# Patient Record
Sex: FEMALE | Race: WHITE | NOT HISPANIC OR LATINO | URBAN - METROPOLITAN AREA
[De-identification: names, ages, dates, MRNs, and addresses within clinical notes are randomized per-mention and may not be internally consistent; named-entity substitution may affect disease eponyms.]

---

## 2023-11-17 ENCOUNTER — APPOINTMENT (RX ONLY)
Dept: URBAN - METROPOLITAN AREA CLINIC 161 | Facility: CLINIC | Age: 58
Setting detail: DERMATOLOGY
End: 2023-11-17

## 2023-11-17 DIAGNOSIS — Z41.9 ENCOUNTER FOR PROCEDURE FOR PURPOSES OTHER THAN REMEDYING HEALTH STATE, UNSPECIFIED: ICD-10-CM

## 2023-11-17 PROCEDURE — ? DELUXE HYDRAFACIAL

## 2023-11-17 NOTE — PROCEDURE: DELUXE HYDRAFACIAL
Indication: skin texture
Price (Use Numbers Only, No Special Characters Or $): [address]
Location: face
Procedure: Exfoliation
Treatment Number: 1
Solution: Activ-4
Tip: Hydropeel Tip, Blue
Vacuum Pressure Low Setting (Will Not Render If Set To 0): [address]
Vacuum Pressure High Setting (Will Not Render If Set To 0): 0
Number Of Passes: 2
Procedure: Peel
Solution: GlySal 7.5%
Tip: Hydropeel Tip, Teal
Vacuum Pressure Low Setting (Will Not Render If Set To 0): 16
Procedure: Extraction
Solution: Beta-HD
Vacuum Pressure Low Setting (Will Not Render If Set To 0): 15
Vacuum Pressure High Setting (Will Not Render If Set To 0): [facility] [place]
Procedure: Boost
Solution Override
Solution Override: regen gf
Tip: Hydropeel Tip, Clear
Procedure: Fusion
Procedure: Extend and Protect
Solution Override: antiox+
Consent: Written consent obtained, risks reviewed including but not limited to crusting, scabbing, blistering, scarring, darker or lighter pigmentary change, bruising, and/or incomplete response.
Post-Care Instructions: I reviewed with the patient in detail post-care instructions. Patient should stay away from the sun and wear sun protection until treated areas are fully healed.